# Patient Record
(demographics unavailable — no encounter records)

---

## 2024-12-10 NOTE — HISTORY OF PRESENT ILLNESS
[Never] : never [TextBox_4] : Ms. GALVAN is a 62 year woman with a medical history significant for arthritis and GERD following-up today to the clinic for cough.    Patient Summary Her cough started September 2022, and usually worsens around the same time each year when the air gets colder, but this year was especially bad.  She has been following with an allergist regarding this before, and has no significant allergies except to dog and dust.  She recently followed up with a GI and underwent endoscopy for her ongoing GERD.  Her medication regimen now controls her heartburn symptoms well, but her cough was still persistent.  Previous testing with allergist has shown that she is only allergic to dust and dogs, rather than a seasonal allergy.  Her symptoms are also and have generally been worst at home.  She believes that because her symptoms started around the time cold weather starts, it may be related to the heater in her house.   Interval History: At our last visit, patient's symptoms had significantly improved and she had stopped using intranasal steroids.  Her GERD was under good control with her GI specialist.  Today, she reports she was recently sick with the flu, having recovered about 2-3 weeks ago now.  Was given albuterol by  and told to follow-up with her pulmonologist.  She was using the albuterol and she found that it was helping her. Has also in the interval had bad bronchitis at least 1-2x /year since she saw me last. Still using flonase

## 2025-02-03 NOTE — HISTORY OF PRESENT ILLNESS
[Never] : never [TextBox_4] : Ms. GALVAN is a 62 year woman with a medical history significant for arthritis and GERD following-up today to the clinic for cough.    Patient Summary Her cough started September 2022, and usually worsens around the same time each year when the air gets colder, but this year was especially bad.  She has been following with an allergist regarding this before, and has no significant allergies except to dog and dust.  She recently followed up with a GI and underwent endoscopy for her ongoing GERD.  Her medication regimen now controls her heartburn symptoms well, but her cough was still persistent.  Previous testing with allergist has shown that she is only allergic to dust and dogs, rather than a seasonal allergy.  Her symptoms are also and have generally been worst at home.  She believes that because her symptoms started around the time cold weather starts, it may be related to the heater in her house.   Interval History: At our last visit, patient was ordered for PFT due to symptoms consistent with asthma.  Her PFT however is consistent with mild restriction with mild diffusion impairment. She was also started on Symbicort as needed, which patient reports she never received 2/2 insurance denial.  She got Advair. Otherwise, cough is completely resolved now that she is on the medication. Still using flonase

## 2025-06-09 NOTE — HISTORY OF PRESENT ILLNESS
[Never] : never [TextBox_4] : Ms. GALVAN is a 62 year woman with a medical history significant for arthritis and GERD following-up today to the clinic for cough.    Patient Summary Her cough started September 2022, and usually worsens around the same time each year when the air gets colder, but this year was especially bad.  She has been following with an allergist regarding this before, and has no significant allergies except to dog and dust.  She recently followed up with a GI and underwent endoscopy for her ongoing GERD.  Her medication regimen now controls her heartburn symptoms well, but her cough was still persistent.  Previous testing with allergist has shown that she is only allergic to dust and dogs, rather than a seasonal allergy.  Her symptoms are also and have generally been worst at home.  She believes that because her symptoms started around the time cold weather starts, it may be related to the heater in her house.   In Feb 2025, patient started Advair which resolved her cough.  Interval History: At our last visit, patient was started on Advair and had complete resolution of her cough. Today, patient reports her cough is back, but still a lot better than initially She is not currently on her ICS/LABA due to insurance coverage

## 2025-06-09 NOTE — PHYSICAL EXAM
[No Acute Distress] : no acute distress [Normal Oropharynx] : normal oropharynx [Normal Appearance] : normal appearance [No Neck Mass] : no neck mass [Normal Rate/Rhythm] : normal rate/rhythm [Normal S1, S2] : normal s1, s2 [No Murmurs] : no murmurs [No Resp Distress] : no resp distress [No Abnormalities] : no abnormalities [Clear to Auscultation Bilaterally] : clear to auscultation bilaterally [Normal Gait] : normal gait [Benign] : benign [No Clubbing] : no clubbing [No Cyanosis] : no cyanosis [No Edema] : no edema [FROM] : FROM [No Focal Deficits] : no focal deficits [Normal Color/ Pigmentation] : normal color/ pigmentation [Normal Affect] : normal affect [Oriented x3] : oriented x3